# Patient Record
Sex: FEMALE | Race: WHITE | NOT HISPANIC OR LATINO | Employment: FULL TIME | URBAN - METROPOLITAN AREA
[De-identification: names, ages, dates, MRNs, and addresses within clinical notes are randomized per-mention and may not be internally consistent; named-entity substitution may affect disease eponyms.]

---

## 2021-09-30 ENCOUNTER — HISTORICAL (OUTPATIENT)
Dept: ADMINISTRATIVE | Facility: HOSPITAL | Age: 26
End: 2021-09-30

## 2021-10-02 LAB — FINAL CULTURE: NORMAL

## 2022-01-20 ENCOUNTER — HISTORICAL (OUTPATIENT)
Dept: ADMINISTRATIVE | Facility: HOSPITAL | Age: 27
End: 2022-01-20

## 2022-01-20 LAB
ABS NEUT (OLG): 4.41 X10(3)/MCL (ref 2.1–9.2)
ALBUMIN SERPL-MCNC: 3.8 GM/DL (ref 3.5–5)
ALBUMIN/GLOB SERPL: 1.4 RATIO (ref 1.1–2)
ALP SERPL-CCNC: 44 UNIT/L (ref 40–150)
ALT SERPL-CCNC: 14 UNIT/L (ref 0–55)
APPEARANCE, UA: CLEAR
AST SERPL-CCNC: 16 UNIT/L (ref 5–34)
BACTERIA SPEC CULT: ABNORMAL /HPF
BASOPHILS # BLD AUTO: 0.1 X10(3)/MCL (ref 0–0.2)
BASOPHILS NFR BLD AUTO: 1 %
BILIRUB SERPL-MCNC: 0.5 MG/DL
BILIRUB UR QL STRIP: NEGATIVE
BILIRUBIN DIRECT+TOT PNL SERPL-MCNC: 0.2 MG/DL (ref 0–0.5)
BILIRUBIN DIRECT+TOT PNL SERPL-MCNC: 0.3 MG/DL (ref 0–0.8)
BUN SERPL-MCNC: 10.9 MG/DL (ref 7–18.7)
C3 SERPL-MCNC: 88 MG/DL (ref 80–173)
C4 SERPL-MCNC: 14.3 MG/DL (ref 13–46)
CALCIUM SERPL-MCNC: 8.8 MG/DL (ref 8.7–10.5)
CHLORIDE SERPL-SCNC: 106 MMOL/L (ref 98–107)
CO2 SERPL-SCNC: 27 MMOL/L (ref 22–29)
COLOR UR: YELLOW
CREAT SERPL-MCNC: 0.71 MG/DL (ref 0.55–1.02)
CRP SERPL HS-MCNC: 0.19 MG/DL
DEPRECATED CALCIDIOL+CALCIFEROL SERPL-MC: 36.9 NG/ML (ref 30–80)
EOSINOPHIL # BLD AUTO: 0.3 X10(3)/MCL (ref 0–0.9)
EOSINOPHIL NFR BLD AUTO: 4 %
ERYTHROCYTE [DISTWIDTH] IN BLOOD BY AUTOMATED COUNT: 12.8 % (ref 11.5–17)
GLOBULIN SER-MCNC: 2.7 GM/DL (ref 2.4–3.5)
GLUCOSE (UA): NEGATIVE
GLUCOSE SERPL-MCNC: 69 MG/DL (ref 74–100)
HCT VFR BLD AUTO: 36.4 % (ref 37–47)
HGB BLD-MCNC: 11.5 GM/DL (ref 12–16)
HGB UR QL STRIP: NEGATIVE
KETONES UR QL STRIP: NEGATIVE
LEUKOCYTE ESTERASE UR QL STRIP: NEGATIVE
LYMPHOCYTES # BLD AUTO: 0.9 X10(3)/MCL (ref 0.6–4.6)
LYMPHOCYTES NFR BLD AUTO: 13 %
MCH RBC QN AUTO: 29.3 PG (ref 27–31)
MCHC RBC AUTO-ENTMCNC: 31.6 GM/DL (ref 33–36)
MCV RBC AUTO: 92.9 FL (ref 80–94)
MONOCYTES # BLD AUTO: 0.9 X10(3)/MCL (ref 0.1–1.3)
MONOCYTES NFR BLD AUTO: 14 %
NEUTROPHILS # BLD AUTO: 4.41 X10(3)/MCL (ref 2.1–9.2)
NEUTROPHILS NFR BLD AUTO: 68 %
NITRITE UR QL STRIP: NEGATIVE
PH UR STRIP: 7 [PH] (ref 5–9)
PLATELET # BLD AUTO: 215 X10(3)/MCL (ref 130–400)
PMV BLD AUTO: 9.9 FL (ref 9.4–12.4)
POTASSIUM SERPL-SCNC: 3.6 MMOL/L (ref 3.5–5.1)
PROT SERPL-MCNC: 6.5 GM/DL (ref 6.4–8.3)
PROT UR QL STRIP: ABNORMAL
RBC # BLD AUTO: 3.92 X10(6)/MCL (ref 4.2–5.4)
RBC #/AREA URNS HPF: ABNORMAL /[HPF]
SODIUM SERPL-SCNC: 140 MMOL/L (ref 136–145)
SP GR UR STRIP: 1.02 (ref 1–1.03)
SQUAMOUS EPITHELIAL, UA: ABNORMAL /HPF (ref 0–4)
TSH SERPL-ACNC: 2.19 UIU/ML (ref 0.35–4.94)
UROBILINOGEN UR STRIP-ACNC: 0.2
WBC # SPEC AUTO: 6.5 X10(3)/MCL (ref 4.5–11.5)
WBC #/AREA URNS AUTO: ABNORMAL /HPF (ref 0–3)
WBC #/AREA URNS HPF: ABNORMAL /[HPF]

## 2022-04-09 ENCOUNTER — HISTORICAL (OUTPATIENT)
Dept: ADMINISTRATIVE | Facility: HOSPITAL | Age: 27
End: 2022-04-09
Payer: MEDICAID

## 2022-04-21 ENCOUNTER — HISTORICAL (OUTPATIENT)
Dept: ADMINISTRATIVE | Facility: HOSPITAL | Age: 27
End: 2022-04-21
Payer: MEDICAID

## 2022-04-21 LAB
APPEARANCE, UA: CLEAR
BACTERIA SPEC CULT: NORMAL
BILIRUB UR QL STRIP: NEGATIVE
BUDDING YEAST: NORMAL
CASTS, UA: NORMAL
COLOR UR: YELLOW
CREAT UR-MCNC: 29.9 MG/DL (ref 45–106)
CRYSTALS: NORMAL
GLUCOSE (UA): NEGATIVE
HGB UR QL STRIP: NEGATIVE
KETONES UR QL STRIP: NEGATIVE
LEUKOCYTE ESTERASE UR QL STRIP: NEGATIVE
NITRITE UR QL STRIP: NEGATIVE
PH UR STRIP: 8 [PH] (ref 5–9)
PROT UR QL STRIP: NORMAL
PROT UR STRIP-MCNC: 24.1 MG/DL
PROT/CREAT UR-RTO: 806
RBC #/AREA URNS HPF: NORMAL /[HPF] (ref 0–2)
SMALL ROUND CELLS, UA: NORMAL
SP GR UR STRIP: 1.01 (ref 1–1.03)
SPERM URNS QL MICRO: NORMAL
SQUAMOUS EPITHELIAL, UA: NORMAL (ref 0–4)
UA WBC MAN: NORMAL (ref 0–2)
UROBILINOGEN UR STRIP-ACNC: 0.2
WBC #/AREA URNS HPF: NORMAL /[HPF] (ref 0–2)

## 2022-04-25 VITALS
BODY MASS INDEX: 25.42 KG/M2 | OXYGEN SATURATION: 95 % | HEIGHT: 65 IN | DIASTOLIC BLOOD PRESSURE: 70 MMHG | SYSTOLIC BLOOD PRESSURE: 111 MMHG | WEIGHT: 152.56 LBS

## 2022-05-25 RX ORDER — AZATHIOPRINE 50 MG/1
50 TABLET ORAL 2 TIMES DAILY
Qty: 60 TABLET | Refills: 5 | Status: SHIPPED | OUTPATIENT
Start: 2022-05-25 | End: 2022-10-13 | Stop reason: SDUPTHER

## 2022-05-25 RX ORDER — HYDROXYCHLOROQUINE SULFATE 200 MG/1
200 TABLET, FILM COATED ORAL 2 TIMES DAILY
Qty: 60 TABLET | Refills: 5 | Status: SHIPPED | OUTPATIENT
Start: 2022-05-25 | End: 2022-10-13 | Stop reason: SDUPTHER

## 2022-08-01 ENCOUNTER — TELEPHONE (OUTPATIENT)
Dept: RHEUMATOLOGY | Facility: CLINIC | Age: 27
End: 2022-08-01
Payer: MEDICAID

## 2022-08-01 NOTE — TELEPHONE ENCOUNTER
Pt called wanting to know the results to her urine test she had done at the end of April. Results in chart  Please Advise  Thanks

## 2022-10-13 ENCOUNTER — OFFICE VISIT (OUTPATIENT)
Dept: RHEUMATOLOGY | Facility: CLINIC | Age: 27
End: 2022-10-13
Payer: MEDICAID

## 2022-10-13 VITALS
SYSTOLIC BLOOD PRESSURE: 101 MMHG | DIASTOLIC BLOOD PRESSURE: 61 MMHG | WEIGHT: 146.19 LBS | HEART RATE: 60 BPM | HEIGHT: 64 IN | RESPIRATION RATE: 16 BRPM | OXYGEN SATURATION: 99 % | TEMPERATURE: 99 F | BODY MASS INDEX: 24.96 KG/M2

## 2022-10-13 DIAGNOSIS — E55.9 VITAMIN D DEFICIENCY: ICD-10-CM

## 2022-10-13 DIAGNOSIS — M32.19 OTHER SYSTEMIC LUPUS ERYTHEMATOSUS WITH OTHER ORGAN INVOLVEMENT: Primary | ICD-10-CM

## 2022-10-13 DIAGNOSIS — D68.61 ANTIPHOSPHOLIPID SYNDROME: ICD-10-CM

## 2022-10-13 PROBLEM — M32.9 SYSTEMIC LUPUS ERYTHEMATOSUS: Status: ACTIVE | Noted: 2022-10-13

## 2022-10-13 PROCEDURE — 99213 OFFICE O/P EST LOW 20 MIN: CPT | Mod: PBBFAC | Performed by: INTERNAL MEDICINE

## 2022-10-13 PROCEDURE — 1160F PR REVIEW ALL MEDS BY PRESCRIBER/CLIN PHARMACIST DOCUMENTED: ICD-10-PCS | Mod: CPTII,,, | Performed by: INTERNAL MEDICINE

## 2022-10-13 PROCEDURE — 99999 PR PBB SHADOW E&M-EST. PATIENT-LVL III: CPT | Mod: PBBFAC,,, | Performed by: INTERNAL MEDICINE

## 2022-10-13 PROCEDURE — 1159F PR MEDICATION LIST DOCUMENTED IN MEDICAL RECORD: ICD-10-PCS | Mod: CPTII,,, | Performed by: INTERNAL MEDICINE

## 2022-10-13 PROCEDURE — 99213 PR OFFICE/OUTPT VISIT, EST, LEVL III, 20-29 MIN: ICD-10-PCS | Mod: S$PBB,,, | Performed by: INTERNAL MEDICINE

## 2022-10-13 PROCEDURE — 1160F RVW MEDS BY RX/DR IN RCRD: CPT | Mod: CPTII,,, | Performed by: INTERNAL MEDICINE

## 2022-10-13 PROCEDURE — 99999 PR PBB SHADOW E&M-EST. PATIENT-LVL III: ICD-10-PCS | Mod: PBBFAC,,, | Performed by: INTERNAL MEDICINE

## 2022-10-13 PROCEDURE — 3074F SYST BP LT 130 MM HG: CPT | Mod: CPTII,,, | Performed by: INTERNAL MEDICINE

## 2022-10-13 PROCEDURE — 3078F DIAST BP <80 MM HG: CPT | Mod: CPTII,,, | Performed by: INTERNAL MEDICINE

## 2022-10-13 PROCEDURE — 1159F MED LIST DOCD IN RCRD: CPT | Mod: CPTII,,, | Performed by: INTERNAL MEDICINE

## 2022-10-13 PROCEDURE — 3074F PR MOST RECENT SYSTOLIC BLOOD PRESSURE < 130 MM HG: ICD-10-PCS | Mod: CPTII,,, | Performed by: INTERNAL MEDICINE

## 2022-10-13 PROCEDURE — 3078F PR MOST RECENT DIASTOLIC BLOOD PRESSURE < 80 MM HG: ICD-10-PCS | Mod: CPTII,,, | Performed by: INTERNAL MEDICINE

## 2022-10-13 PROCEDURE — 99213 OFFICE O/P EST LOW 20 MIN: CPT | Mod: S$PBB,,, | Performed by: INTERNAL MEDICINE

## 2022-10-13 RX ORDER — ASPIRIN 81 MG/1
81 TABLET ORAL DAILY
COMMUNITY
Start: 2022-10-04 | End: 2022-10-13 | Stop reason: SDUPTHER

## 2022-10-13 RX ORDER — DICLOFENAC SODIUM 50 MG/1
50 TABLET, DELAYED RELEASE ORAL 2 TIMES DAILY PRN
Qty: 60 TABLET | Refills: 5 | Status: SHIPPED | OUTPATIENT
Start: 2022-10-13 | End: 2022-11-17

## 2022-10-13 RX ORDER — LANOLIN ALCOHOL/MO/W.PET/CERES
1 CREAM (GRAM) TOPICAL NIGHTLY
COMMUNITY
Start: 2022-10-04 | End: 2022-10-13 | Stop reason: SDUPTHER

## 2022-10-13 RX ORDER — HYDROXYCHLOROQUINE SULFATE 200 MG/1
200 TABLET, FILM COATED ORAL 2 TIMES DAILY
Qty: 60 TABLET | Refills: 5 | Status: SHIPPED | OUTPATIENT
Start: 2022-10-13 | End: 2023-01-19 | Stop reason: SDUPTHER

## 2022-10-13 RX ORDER — ASPIRIN 81 MG/1
81 TABLET ORAL DAILY
Qty: 30 TABLET | Refills: 5 | Status: SHIPPED | OUTPATIENT
Start: 2022-10-13 | End: 2023-01-19 | Stop reason: SDUPTHER

## 2022-10-13 RX ORDER — FLUOXETINE HYDROCHLORIDE 20 MG/1
20 CAPSULE ORAL DAILY
COMMUNITY
Start: 2022-10-12 | End: 2023-01-19

## 2022-10-13 RX ORDER — ASPIRIN 325 MG
50000 TABLET, DELAYED RELEASE (ENTERIC COATED) ORAL
Qty: 5 CAPSULE | Refills: 5 | Status: SHIPPED | OUTPATIENT
Start: 2022-10-13 | End: 2023-01-19 | Stop reason: SDUPTHER

## 2022-10-13 RX ORDER — AZATHIOPRINE 50 MG/1
50 TABLET ORAL 2 TIMES DAILY
Qty: 60 TABLET | Refills: 5 | Status: SHIPPED | OUTPATIENT
Start: 2022-10-13 | End: 2023-01-19 | Stop reason: SDUPTHER

## 2022-10-13 RX ORDER — ASPIRIN 325 MG
TABLET, DELAYED RELEASE (ENTERIC COATED) ORAL
COMMUNITY
Start: 2022-10-04 | End: 2022-10-13 | Stop reason: SDUPTHER

## 2022-10-13 RX ORDER — OMEPRAZOLE 40 MG/1
40 CAPSULE, DELAYED RELEASE ORAL DAILY
Qty: 30 CAPSULE | Refills: 11 | Status: SHIPPED | OUTPATIENT
Start: 2022-10-13 | End: 2023-05-17

## 2022-10-13 RX ORDER — BUSPIRONE HYDROCHLORIDE 5 MG/1
5 TABLET ORAL 3 TIMES DAILY PRN
COMMUNITY
Start: 2022-07-03 | End: 2023-01-19

## 2022-10-13 RX ORDER — LANOLIN ALCOHOL/MO/W.PET/CERES
1 CREAM (GRAM) TOPICAL NIGHTLY
Qty: 30 TABLET | Refills: 5 | Status: SHIPPED | OUTPATIENT
Start: 2022-10-13 | End: 2023-01-19

## 2022-10-13 NOTE — PROGRESS NOTES
"Subjective:       Patient ID: Marie Gibson is a 26 y.o. female.    Chief Complaint: Follow-up    The patient is complaining of joint pain involving the MCP PIP wrist elbow shoulders hips knees and ankles bilaterally.  The pain is 1/10 in intensity dull in quality and continuous.  That is associated with a morning stiffness lasting for less than 60 minutes.  Is also having difficulty maintaining a good night of sleep.  This has been associated with myalgias.  Muscle aches are 1/10 in intensity dull in quality and continuous.  They are associated with fatigue.  No fever no chills no others.      Review of Systems   Constitutional:  Negative for appetite change, chills and fever.   HENT:  Negative for congestion, ear pain, mouth sores, nosebleeds and trouble swallowing.    Eyes:  Negative for photophobia and discharge.   Respiratory:  Negative for chest tightness and shortness of breath.    Cardiovascular:  Negative for chest pain.   Gastrointestinal:  Negative for abdominal pain and vomiting.   Endocrine: Negative.    Genitourinary:  Negative for hematuria.   Musculoskeletal:         As per HPI   Skin:  Negative for rash.   Neurological:  Negative for weakness.       Objective:   /61 (BP Location: Left arm, Patient Position: Sitting, BP Method: Small (Automatic))   Pulse 60   Temp 98.5 °F (36.9 °C)   Resp 16   Ht 5' 4" (1.626 m)   Wt 66.3 kg (146 lb 3.2 oz)   SpO2 99%   BMI 25.10 kg/m²      Physical Exam   Constitutional: She is oriented to person, place, and time. She appears well-developed and well-nourished. No distress.   HENT:   Head: Normocephalic and atraumatic.   Right Ear: External ear normal.   Left Ear: External ear normal.   Eyes: Pupils are equal, round, and reactive to light.   Cardiovascular: Normal rate, regular rhythm and normal heart sounds.   Pulmonary/Chest: Breath sounds normal.   Abdominal: Soft. There is no abdominal tenderness.   Musculoskeletal:      Right shoulder: " Normal.      Left shoulder: Normal.      Right elbow: Normal.      Left elbow: Normal.      Right wrist: Normal.      Left wrist: Normal.      Cervical back: Neck supple.      Right hip: Normal.      Left hip: Normal.      Right knee: Normal.      Left knee: Normal.   Lymphadenopathy:     She has no cervical adenopathy.   Neurological: She is alert and oriented to person, place, and time. She displays normal reflexes. No cranial nerve deficit or sensory deficit. She exhibits normal muscle tone. Coordination normal.   Skin: No rash noted. No erythema.   Vitals reviewed.      Right Side Rheumatological Exam     Examination finds the shoulder, elbow, wrist, knee, 1st PIP, 1st MCP, 2nd PIP, 2nd MCP, 3rd PIP, 3rd MCP, 4th PIP, 4th MCP, 5th PIP, 5th MCP, temporomandibular, hip, ankle, 1st MTP, 2nd MTP, 3rd MTP, 4th MTP and 5th MTP normal.    Left Side Rheumatological Exam     Examination finds the shoulder, elbow, wrist, knee, 1st PIP, 1st MCP, 2nd PIP, 2nd MCP, 3rd PIP, 3rd MCP, 4th PIP, 4th MCP, 5th PIP, 5th MCP, temporomandibular, hip, ankle, 1st MTP, 2nd MTP, 3rd MTP, 4th MTP and 5th MTP normal.       Completed Fibromyalgia exam 4/18 tender points.  No data to display     Assessment:       1. Other systemic lupus erythematosus with other organ involvement    2. Antiphospholipid syndrome    3. Vitamin D deficiency              Plan:       Problem List Items Addressed This Visit          Immunology/Multi System    Systemic lupus erythematosus - Primary    Relevant Medications    magnesium oxide (MAG-OX) 400 mg (241.3 mg magnesium) tablet    cholecalciferol, vitamin D3, 1,250 mcg (50,000 unit) capsule    azaTHIOprine (IMURAN) 50 mg Tab    aspirin (ECOTRIN) 81 MG EC tablet    hydrOXYchloroQUINE (PLAQUENIL) 200 mg tablet    Other Relevant Orders    DSDNA    C4 Complement    C3 Complement    Urinalysis    Protein/Creatinine Ratio, Urine    Quantiferon Gold TB    CBC Auto Differential    Comprehensive Metabolic Panel     C-Reactive Protein    Antinuclear Ab, HEp-2 Substrate       Hematology    Antiphospholipid syndrome    Relevant Medications    magnesium oxide (MAG-OX) 400 mg (241.3 mg magnesium) tablet    cholecalciferol, vitamin D3, 1,250 mcg (50,000 unit) capsule    azaTHIOprine (IMURAN) 50 mg Tab    aspirin (ECOTRIN) 81 MG EC tablet    hydrOXYchloroQUINE (PLAQUENIL) 200 mg tablet    Other Relevant Orders    DSDNA    C4 Complement    C3 Complement    Urinalysis    Protein/Creatinine Ratio, Urine    Quantiferon Gold TB    CBC Auto Differential    Comprehensive Metabolic Panel    C-Reactive Protein    Antinuclear Ab, HEp-2 Substrate       Endocrine    Vitamin D deficiency    Relevant Medications    magnesium oxide (MAG-OX) 400 mg (241.3 mg magnesium) tablet    cholecalciferol, vitamin D3, 1,250 mcg (50,000 unit) capsule    azaTHIOprine (IMURAN) 50 mg Tab    aspirin (ECOTRIN) 81 MG EC tablet    hydrOXYchloroQUINE (PLAQUENIL) 200 mg tablet    Other Relevant Orders    DSDNA    C4 Complement    C3 Complement    Urinalysis    Protein/Creatinine Ratio, Urine    Quantiferon Gold TB    CBC Auto Differential    Comprehensive Metabolic Panel    C-Reactive Protein    Antinuclear Ab, HEp-2 Substrate

## 2022-11-16 ENCOUNTER — TELEPHONE (OUTPATIENT)
Dept: RHEUMATOLOGY | Facility: CLINIC | Age: 27
End: 2022-11-16
Payer: MEDICAID

## 2022-11-16 DIAGNOSIS — M32.19 OTHER SYSTEMIC LUPUS ERYTHEMATOSUS WITH OTHER ORGAN INVOLVEMENT: Primary | ICD-10-CM

## 2022-11-16 NOTE — TELEPHONE ENCOUNTER
Patient stated that she is having issues with her stomach since starting Diclofenac and Omeprazole last month when you started her on it. She stated that when she does not take it she does not have any issues. Please advice  Thank you MR

## 2022-11-17 ENCOUNTER — TELEPHONE (OUTPATIENT)
Dept: RHEUMATOLOGY | Facility: CLINIC | Age: 27
End: 2022-11-17
Payer: MEDICAID

## 2022-11-17 RX ORDER — TRAMADOL HYDROCHLORIDE 50 MG/1
50 TABLET ORAL 3 TIMES DAILY PRN
Qty: 90 TABLET | Refills: 5 | Status: SHIPPED | OUTPATIENT
Start: 2022-11-17 | End: 2023-05-17

## 2022-11-17 NOTE — TELEPHONE ENCOUNTER
Notified the patient of tramadol being sent to her pharmacy and to stop the Diclofenac but keep taking the Omeprazole. The patient verbalized understanding

## 2022-12-09 ENCOUNTER — OUTSIDE PLACE OF SERVICE (OUTPATIENT)
Dept: URGENT CARE | Facility: CLINIC | Age: 27
End: 2022-12-09
Payer: MEDICAID

## 2022-12-09 DIAGNOSIS — J10.89 INFLUENZA DUE TO OTHER IDENTIFIED INFLUENZA VIRUS WITH OTHER MANIFESTATIONS: Primary | ICD-10-CM

## 2022-12-09 PROCEDURE — 99212 PR OFFICE/OUTPT VISIT, EST, LEVL II, 10-19 MIN: ICD-10-PCS | Mod: 95,,, | Performed by: FAMILY MEDICINE

## 2022-12-09 PROCEDURE — 99212 OFFICE O/P EST SF 10 MIN: CPT | Mod: 95,,, | Performed by: FAMILY MEDICINE

## 2023-01-06 ENCOUNTER — TELEPHONE (OUTPATIENT)
Dept: RHEUMATOLOGY | Facility: CLINIC | Age: 28
End: 2023-01-06
Payer: MEDICAID

## 2023-01-06 NOTE — TELEPHONE ENCOUNTER
Patient called stating that her Gynecologist has prescribe a low dose estrogen birth control pill and she wants to know if this medication will interact with medications prescribed by Dr. Wallace.

## 2023-01-19 ENCOUNTER — OFFICE VISIT (OUTPATIENT)
Dept: RHEUMATOLOGY | Facility: CLINIC | Age: 28
End: 2023-01-19
Payer: MEDICAID

## 2023-01-19 VITALS
WEIGHT: 157.38 LBS | OXYGEN SATURATION: 97 % | HEIGHT: 64 IN | HEART RATE: 73 BPM | BODY MASS INDEX: 26.87 KG/M2 | TEMPERATURE: 99 F | DIASTOLIC BLOOD PRESSURE: 57 MMHG | SYSTOLIC BLOOD PRESSURE: 102 MMHG

## 2023-01-19 DIAGNOSIS — D50.0 IRON DEFICIENCY ANEMIA DUE TO CHRONIC BLOOD LOSS: ICD-10-CM

## 2023-01-19 DIAGNOSIS — E55.9 VITAMIN D DEFICIENCY: ICD-10-CM

## 2023-01-19 DIAGNOSIS — D68.61 ANTIPHOSPHOLIPID SYNDROME: ICD-10-CM

## 2023-01-19 DIAGNOSIS — M32.19 OTHER SYSTEMIC LUPUS ERYTHEMATOSUS WITH OTHER ORGAN INVOLVEMENT: Primary | ICD-10-CM

## 2023-01-19 PROCEDURE — 99213 OFFICE O/P EST LOW 20 MIN: CPT | Mod: PBBFAC | Performed by: INTERNAL MEDICINE

## 2023-01-19 PROCEDURE — 3078F DIAST BP <80 MM HG: CPT | Mod: CPTII,,, | Performed by: INTERNAL MEDICINE

## 2023-01-19 PROCEDURE — 3074F SYST BP LT 130 MM HG: CPT | Mod: CPTII,,, | Performed by: INTERNAL MEDICINE

## 2023-01-19 PROCEDURE — 99214 OFFICE O/P EST MOD 30 MIN: CPT | Mod: S$PBB,,, | Performed by: INTERNAL MEDICINE

## 2023-01-19 PROCEDURE — 99214 PR OFFICE/OUTPT VISIT, EST, LEVL IV, 30-39 MIN: ICD-10-PCS | Mod: S$PBB,,, | Performed by: INTERNAL MEDICINE

## 2023-01-19 PROCEDURE — 3078F PR MOST RECENT DIASTOLIC BLOOD PRESSURE < 80 MM HG: ICD-10-PCS | Mod: CPTII,,, | Performed by: INTERNAL MEDICINE

## 2023-01-19 PROCEDURE — 3008F BODY MASS INDEX DOCD: CPT | Mod: CPTII,,, | Performed by: INTERNAL MEDICINE

## 2023-01-19 PROCEDURE — 99999 PR PBB SHADOW E&M-EST. PATIENT-LVL III: ICD-10-PCS | Mod: PBBFAC,,, | Performed by: INTERNAL MEDICINE

## 2023-01-19 PROCEDURE — 1159F PR MEDICATION LIST DOCUMENTED IN MEDICAL RECORD: ICD-10-PCS | Mod: CPTII,,, | Performed by: INTERNAL MEDICINE

## 2023-01-19 PROCEDURE — 1160F PR REVIEW ALL MEDS BY PRESCRIBER/CLIN PHARMACIST DOCUMENTED: ICD-10-PCS | Mod: CPTII,,, | Performed by: INTERNAL MEDICINE

## 2023-01-19 PROCEDURE — 3008F PR BODY MASS INDEX (BMI) DOCUMENTED: ICD-10-PCS | Mod: CPTII,,, | Performed by: INTERNAL MEDICINE

## 2023-01-19 PROCEDURE — 99999 PR PBB SHADOW E&M-EST. PATIENT-LVL III: CPT | Mod: PBBFAC,,, | Performed by: INTERNAL MEDICINE

## 2023-01-19 PROCEDURE — 1160F RVW MEDS BY RX/DR IN RCRD: CPT | Mod: CPTII,,, | Performed by: INTERNAL MEDICINE

## 2023-01-19 PROCEDURE — 3074F PR MOST RECENT SYSTOLIC BLOOD PRESSURE < 130 MM HG: ICD-10-PCS | Mod: CPTII,,, | Performed by: INTERNAL MEDICINE

## 2023-01-19 PROCEDURE — 1159F MED LIST DOCD IN RCRD: CPT | Mod: CPTII,,, | Performed by: INTERNAL MEDICINE

## 2023-01-19 RX ORDER — ASPIRIN 325 MG
50000 TABLET, DELAYED RELEASE (ENTERIC COATED) ORAL
Qty: 5 CAPSULE | Refills: 5 | Status: SHIPPED | OUTPATIENT
Start: 2023-01-19 | End: 2023-05-17 | Stop reason: SDUPTHER

## 2023-01-19 RX ORDER — CYCLOBENZAPRINE HCL 10 MG
10 TABLET ORAL NIGHTLY
Qty: 30 TABLET | Refills: 5 | Status: SHIPPED | OUTPATIENT
Start: 2023-01-19 | End: 2023-05-17 | Stop reason: SDUPTHER

## 2023-01-19 RX ORDER — BUSPIRONE HYDROCHLORIDE 10 MG/1
10 TABLET ORAL 3 TIMES DAILY
COMMUNITY
Start: 2022-10-19

## 2023-01-19 RX ORDER — NORETHINDRONE ACETATE AND ETHINYL ESTRADIOL AND FERROUS FUMARATE 1MG-20(24)
1 KIT ORAL DAILY
COMMUNITY
Start: 2023-01-06 | End: 2023-05-17

## 2023-01-19 RX ORDER — HYDROXYCHLOROQUINE SULFATE 200 MG/1
200 TABLET, FILM COATED ORAL 2 TIMES DAILY
Qty: 60 TABLET | Refills: 5 | Status: SHIPPED | OUTPATIENT
Start: 2023-01-19 | End: 2023-05-17 | Stop reason: SDUPTHER

## 2023-01-19 RX ORDER — ASPIRIN 81 MG/1
81 TABLET ORAL DAILY
Qty: 30 TABLET | Refills: 5 | Status: SHIPPED | OUTPATIENT
Start: 2023-01-19 | End: 2023-09-22 | Stop reason: SDUPTHER

## 2023-01-19 RX ORDER — AZATHIOPRINE 50 MG/1
50 TABLET ORAL 2 TIMES DAILY
Qty: 60 TABLET | Refills: 5 | Status: SHIPPED | OUTPATIENT
Start: 2023-01-19 | End: 2023-05-17 | Stop reason: SDUPTHER

## 2023-01-19 RX ORDER — FERROUS SULFATE 325(65) MG
325 TABLET, DELAYED RELEASE (ENTERIC COATED) ORAL DAILY
Qty: 30 TABLET | Refills: 5 | Status: SHIPPED | OUTPATIENT
Start: 2023-01-19 | End: 2023-05-17 | Stop reason: SDUPTHER

## 2023-01-19 NOTE — PROGRESS NOTES
"Subjective:       Patient ID: Marie Gibson is a 27 y.o. female.    Chief Complaint: Follow-up (No complaints/)    The patient is complaining of joint pain involving the MCP PIP wrist elbow shoulders hips knees and ankles bilaterally.  The pain is 3/10 in intensity dull in quality and continuous.  That is associated with a morning stiffness lasting for more than 60 minutes.  Is also having difficulty maintaining a good night of sleep.  This has been associated with myalgias.  Muscle aches are 3/10 in intensity dull in quality and continuous.  They are associated with fatigue.  No fever no chills no others.      Review of Systems   Constitutional:  Negative for appetite change, chills and fever.   HENT:  Negative for congestion, ear pain, mouth sores, nosebleeds and trouble swallowing.    Eyes:  Negative for photophobia and discharge.   Respiratory:  Negative for chest tightness and shortness of breath.    Cardiovascular:  Negative for chest pain.   Gastrointestinal:  Negative for abdominal pain and vomiting.   Endocrine: Negative.    Genitourinary:  Negative for hematuria.   Musculoskeletal:         As per HPI   Skin:  Negative for rash.   Neurological:  Negative for weakness.       Objective:   BP (!) 102/57 (BP Location: Left arm, Patient Position: Sitting, BP Method: Medium (Automatic))   Pulse 73   Temp 98.7 °F (37.1 °C) (Oral)   Ht 5' 4" (1.626 m)   Wt 71.4 kg (157 lb 6.4 oz)   LMP  (LMP Unknown)   SpO2 97%   BMI 27.02 kg/m²      Physical Exam   Constitutional: She is oriented to person, place, and time. She appears well-developed and well-nourished. No distress.   HENT:   Head: Normocephalic and atraumatic.   Right Ear: External ear normal.   Left Ear: External ear normal.   Eyes: Pupils are equal, round, and reactive to light.   Cardiovascular: Normal rate, regular rhythm and normal heart sounds.   Pulmonary/Chest: Breath sounds normal.   Abdominal: Soft. There is no abdominal tenderness. "   Musculoskeletal:      Right shoulder: Normal.      Left shoulder: Normal.      Right elbow: Normal.      Left elbow: Normal.      Right wrist: Normal.      Left wrist: Normal.      Cervical back: Neck supple.      Right hip: Normal.      Left hip: Normal.      Right knee: Normal.      Left knee: Normal.   Lymphadenopathy:     She has no cervical adenopathy.   Neurological: She is alert and oriented to person, place, and time. She displays normal reflexes. No cranial nerve deficit or sensory deficit. She exhibits normal muscle tone. Coordination normal.   Skin: No rash noted. No erythema.   Vitals reviewed.      Right Side Rheumatological Exam     Examination finds the shoulder, elbow, wrist, knee, 1st PIP, 1st MCP, 2nd PIP, 2nd MCP, 3rd PIP, 3rd MCP, 4th PIP, 4th MCP, 5th PIP, 5th MCP, temporomandibular, hip, ankle, 1st MTP, 2nd MTP, 3rd MTP, 4th MTP and 5th MTP normal.    Left Side Rheumatological Exam     Examination finds the shoulder, elbow, wrist, knee, 1st PIP, 1st MCP, 2nd PIP, 2nd MCP, 3rd PIP, 3rd MCP, 4th PIP, 4th MCP, 5th PIP, 5th MCP, temporomandibular, hip, ankle, 1st MTP, 2nd MTP, 3rd MTP, 4th MTP and 5th MTP normal.       Completed Fibromyalgia exam 8/18 tender points.  No data to display     Assessment:       1. Other systemic lupus erythematosus with other organ involvement    2. Antiphospholipid syndrome    3. Vitamin D deficiency    4. Iron deficiency anemia due to chronic blood loss            Medication List with Changes/Refills   New Medications    CYCLOBENZAPRINE (FLEXERIL) 10 MG TABLET    Take 1 tablet (10 mg total) by mouth every evening.       Start Date: 1/19/2023 End Date: 7/18/2023    FERROUS SULFATE 325 (65 FE) MG EC TABLET    Take 1 tablet (325 mg total) by mouth once daily.       Start Date: 1/19/2023 End Date: --   Current Medications    BUSPIRONE (BUSPAR) 10 MG TABLET    Take 10 mg by mouth 3 (three) times daily.       Start Date: 10/19/2022End Date: --    JUNEL FE 24 1 MG-20 MCG  (24)/75 MG (4) PER TABLET    Take 1 tablet by mouth once daily.       Start Date: 1/6/2023  End Date: --    OMEPRAZOLE (PRILOSEC) 40 MG CAPSULE    Take 1 capsule (40 mg total) by mouth once daily. Before lunch       Start Date: 10/13/2022End Date: 10/13/2023    TRAMADOL (ULTRAM) 50 MG TABLET    Take 1 tablet (50 mg total) by mouth 3 (three) times daily as needed for Pain.       Start Date: 11/17/2022End Date: --   Changed and/or Refilled Medications    Modified Medication Previous Medication    ASPIRIN (ECOTRIN) 81 MG EC TABLET aspirin (ECOTRIN) 81 MG EC tablet       Take 1 tablet (81 mg total) by mouth once daily.    Take 1 tablet (81 mg total) by mouth once daily.       Start Date: 1/19/2023 End Date: --    Start Date: 10/13/2022End Date: 1/19/2023    AZATHIOPRINE (IMURAN) 50 MG TAB azaTHIOprine (IMURAN) 50 mg Tab       Take 1 tablet (50 mg total) by mouth 2 (two) times daily.    Take 1 tablet (50 mg total) by mouth 2 (two) times daily.       Start Date: 1/19/2023 End Date: 1/19/2024    Start Date: 10/13/2022End Date: 1/19/2023    CHOLECALCIFEROL, VITAMIN D3, 1,250 MCG (50,000 UNIT) CAPSULE cholecalciferol, vitamin D3, 1,250 mcg (50,000 unit) capsule       Take 1 capsule (50,000 Units total) by mouth every 7 days.    Take 1 capsule (50,000 Units total) by mouth every 7 days.       Start Date: 1/19/2023 End Date: --    Start Date: 10/13/2022End Date: 1/19/2023    HYDROXYCHLOROQUINE (PLAQUENIL) 200 MG TABLET hydrOXYchloroQUINE (PLAQUENIL) 200 mg tablet       Take 1 tablet (200 mg total) by mouth 2 (two) times daily.    Take 1 tablet (200 mg total) by mouth 2 (two) times daily.       Start Date: 1/19/2023 End Date: --    Start Date: 10/13/2022End Date: 1/19/2023   Discontinued Medications    BUSPIRONE (BUSPAR) 5 MG TAB    Take 5 mg by mouth 3 (three) times daily as needed.       Start Date: 7/3/2022  End Date: 1/19/2023    FLUOXETINE 20 MG CAPSULE    Take 20 mg by mouth once daily.       Start Date: 10/12/2022End  Date: 1/19/2023    MAGNESIUM OXIDE (MAG-OX) 400 MG (241.3 MG MAGNESIUM) TABLET    Take 1 tablet (400 mg total) by mouth every evening.       Start Date: 10/13/2022End Date: 1/19/2023         Plan:         Problem List Items Addressed This Visit          Immunology/Multi System    Systemic lupus erythematosus - Primary    Relevant Medications    cyclobenzaprine (FLEXERIL) 10 MG tablet    aspirin (ECOTRIN) 81 MG EC tablet    azaTHIOprine (IMURAN) 50 mg Tab    cholecalciferol, vitamin D3, 1,250 mcg (50,000 unit) capsule    hydrOXYchloroQUINE (PLAQUENIL) 200 mg tablet    ferrous sulfate 325 (65 FE) MG EC tablet    Other Relevant Orders    DSDNA    C4 Complement    C3 Complement    Urinalysis    Protein/Creatinine Ratio, Urine    CBC Auto Differential    Comprehensive Metabolic Panel    CRP, High Sensitivity    Vitamin D    Antinuclear Antibody (BRANT), HEp-2, IgG       Hematology    Antiphospholipid syndrome    Relevant Medications    cyclobenzaprine (FLEXERIL) 10 MG tablet    aspirin (ECOTRIN) 81 MG EC tablet    azaTHIOprine (IMURAN) 50 mg Tab    cholecalciferol, vitamin D3, 1,250 mcg (50,000 unit) capsule    hydrOXYchloroQUINE (PLAQUENIL) 200 mg tablet    ferrous sulfate 325 (65 FE) MG EC tablet    Other Relevant Orders    DSDNA    C4 Complement    C3 Complement    Urinalysis    Protein/Creatinine Ratio, Urine    CBC Auto Differential    Comprehensive Metabolic Panel    CRP, High Sensitivity    Vitamin D    Antinuclear Antibody (BRANT), HEp-2, IgG       Endocrine    Vitamin D deficiency    Relevant Medications    cyclobenzaprine (FLEXERIL) 10 MG tablet    aspirin (ECOTRIN) 81 MG EC tablet    azaTHIOprine (IMURAN) 50 mg Tab    cholecalciferol, vitamin D3, 1,250 mcg (50,000 unit) capsule    hydrOXYchloroQUINE (PLAQUENIL) 200 mg tablet    ferrous sulfate 325 (65 FE) MG EC tablet    Other Relevant Orders    DSDNA    C4 Complement    C3 Complement    Urinalysis    Protein/Creatinine Ratio, Urine    CBC Auto Differential     Comprehensive Metabolic Panel    CRP, High Sensitivity    Vitamin D    Antinuclear Antibody (BRANT), HEp-2, IgG     Other Visit Diagnoses       Iron deficiency anemia due to chronic blood loss        Relevant Medications    cyclobenzaprine (FLEXERIL) 10 MG tablet    aspirin (ECOTRIN) 81 MG EC tablet    azaTHIOprine (IMURAN) 50 mg Tab    cholecalciferol, vitamin D3, 1,250 mcg (50,000 unit) capsule    hydrOXYchloroQUINE (PLAQUENIL) 200 mg tablet    ferrous sulfate 325 (65 FE) MG EC tablet    Other Relevant Orders    DSDNA    C4 Complement    C3 Complement    Urinalysis    Protein/Creatinine Ratio, Urine    CBC Auto Differential    Comprehensive Metabolic Panel    CRP, High Sensitivity    Vitamin D    Antinuclear Antibody (BRANT), HEp-2, IgG

## 2023-05-17 ENCOUNTER — OFFICE VISIT (OUTPATIENT)
Dept: RHEUMATOLOGY | Facility: CLINIC | Age: 28
End: 2023-05-17
Payer: MEDICAID

## 2023-05-17 VITALS
OXYGEN SATURATION: 98 % | SYSTOLIC BLOOD PRESSURE: 116 MMHG | DIASTOLIC BLOOD PRESSURE: 77 MMHG | HEIGHT: 64 IN | TEMPERATURE: 99 F | HEART RATE: 83 BPM | BODY MASS INDEX: 28.82 KG/M2 | WEIGHT: 168.81 LBS

## 2023-05-17 DIAGNOSIS — D50.0 IRON DEFICIENCY ANEMIA DUE TO CHRONIC BLOOD LOSS: ICD-10-CM

## 2023-05-17 DIAGNOSIS — D68.61 ANTIPHOSPHOLIPID SYNDROME: ICD-10-CM

## 2023-05-17 DIAGNOSIS — E55.9 VITAMIN D DEFICIENCY: ICD-10-CM

## 2023-05-17 DIAGNOSIS — M32.19 OTHER SYSTEMIC LUPUS ERYTHEMATOSUS WITH OTHER ORGAN INVOLVEMENT: Primary | ICD-10-CM

## 2023-05-17 PROCEDURE — 1159F PR MEDICATION LIST DOCUMENTED IN MEDICAL RECORD: ICD-10-PCS | Mod: CPTII,,, | Performed by: INTERNAL MEDICINE

## 2023-05-17 PROCEDURE — 3078F DIAST BP <80 MM HG: CPT | Mod: CPTII,,, | Performed by: INTERNAL MEDICINE

## 2023-05-17 PROCEDURE — 3008F BODY MASS INDEX DOCD: CPT | Mod: CPTII,,, | Performed by: INTERNAL MEDICINE

## 2023-05-17 PROCEDURE — 99213 OFFICE O/P EST LOW 20 MIN: CPT | Mod: PBBFAC | Performed by: INTERNAL MEDICINE

## 2023-05-17 PROCEDURE — 1159F MED LIST DOCD IN RCRD: CPT | Mod: CPTII,,, | Performed by: INTERNAL MEDICINE

## 2023-05-17 PROCEDURE — 3078F PR MOST RECENT DIASTOLIC BLOOD PRESSURE < 80 MM HG: ICD-10-PCS | Mod: CPTII,,, | Performed by: INTERNAL MEDICINE

## 2023-05-17 PROCEDURE — 99999 PR PBB SHADOW E&M-EST. PATIENT-LVL III: ICD-10-PCS | Mod: PBBFAC,,, | Performed by: INTERNAL MEDICINE

## 2023-05-17 PROCEDURE — 99999 PR PBB SHADOW E&M-EST. PATIENT-LVL III: CPT | Mod: PBBFAC,,, | Performed by: INTERNAL MEDICINE

## 2023-05-17 PROCEDURE — 99214 PR OFFICE/OUTPT VISIT, EST, LEVL IV, 30-39 MIN: ICD-10-PCS | Mod: S$PBB,,, | Performed by: INTERNAL MEDICINE

## 2023-05-17 PROCEDURE — 3008F PR BODY MASS INDEX (BMI) DOCUMENTED: ICD-10-PCS | Mod: CPTII,,, | Performed by: INTERNAL MEDICINE

## 2023-05-17 PROCEDURE — 99214 OFFICE O/P EST MOD 30 MIN: CPT | Mod: S$PBB,,, | Performed by: INTERNAL MEDICINE

## 2023-05-17 PROCEDURE — 3074F PR MOST RECENT SYSTOLIC BLOOD PRESSURE < 130 MM HG: ICD-10-PCS | Mod: CPTII,,, | Performed by: INTERNAL MEDICINE

## 2023-05-17 PROCEDURE — 3074F SYST BP LT 130 MM HG: CPT | Mod: CPTII,,, | Performed by: INTERNAL MEDICINE

## 2023-05-17 RX ORDER — HYDROXYCHLOROQUINE SULFATE 200 MG/1
200 TABLET, FILM COATED ORAL 2 TIMES DAILY
Qty: 60 TABLET | Refills: 5 | Status: SHIPPED | OUTPATIENT
Start: 2023-05-17 | End: 2023-08-21 | Stop reason: SDUPTHER

## 2023-05-17 RX ORDER — CYCLOBENZAPRINE HCL 10 MG
10 TABLET ORAL NIGHTLY
Qty: 30 TABLET | Refills: 5 | Status: SHIPPED | OUTPATIENT
Start: 2023-05-17 | End: 2023-09-22 | Stop reason: SDUPTHER

## 2023-05-17 RX ORDER — FERROUS SULFATE 325(65) MG
325 TABLET, DELAYED RELEASE (ENTERIC COATED) ORAL DAILY
Qty: 30 TABLET | Refills: 5 | Status: SHIPPED | OUTPATIENT
Start: 2023-05-17 | End: 2023-09-22 | Stop reason: SDUPTHER

## 2023-05-17 RX ORDER — AZATHIOPRINE 50 MG/1
50 TABLET ORAL 2 TIMES DAILY
Qty: 60 TABLET | Refills: 5 | Status: SHIPPED | OUTPATIENT
Start: 2023-05-17 | End: 2023-09-22 | Stop reason: SDUPTHER

## 2023-05-17 RX ORDER — ASPIRIN 325 MG
50000 TABLET, DELAYED RELEASE (ENTERIC COATED) ORAL
Qty: 5 CAPSULE | Refills: 5 | Status: SHIPPED | OUTPATIENT
Start: 2023-05-17 | End: 2023-08-21 | Stop reason: SDUPTHER

## 2023-05-17 NOTE — PROGRESS NOTES
"Subjective:       Patient ID: Marie Gibson is a 27 y.o. female.    Chief Complaint: Follow-up (Follow up. Patient states that she gets a sensation in her arms that almost feels like raindrops falling on her arms at times. Also gets a dull pain in her shoulders at times. No pain at this time. )    The patient is complaining of joint pain involving the MCP PIP wrist elbow shoulders hips knees and ankles bilaterally.  The pain is 2/10 in intensity dull in quality and continuous.  That is associated with a morning stiffness lasting for less  than 60 minutes.  Is also having difficulty maintaining a good night of sleep.  This has been associated with myalgias.  Muscle aches are 3/10 in intensity dull in quality and continuous.  They are associated with fatigue.  No fever no chills no others.  Labs checked during this visit       Review of Systems   Constitutional:  Negative for appetite change, chills and fever.   HENT:  Negative for congestion, ear pain, mouth sores, nosebleeds and trouble swallowing.    Eyes:  Negative for photophobia and discharge.   Respiratory:  Negative for chest tightness and shortness of breath.    Cardiovascular:  Negative for chest pain.   Gastrointestinal:  Negative for abdominal pain and vomiting.   Endocrine: Negative.    Genitourinary:  Negative for hematuria.   Musculoskeletal:         As per HPI   Skin:  Negative for rash.   Neurological:  Negative for weakness.       Objective:   /77 (BP Location: Right arm, Patient Position: Sitting, BP Method: Medium (Automatic))   Pulse 83   Temp 98.6 °F (37 °C) (Oral)   Ht 5' 4" (1.626 m)   Wt 76.6 kg (168 lb 12.8 oz)   LMP 04/26/2023   SpO2 98%   BMI 28.97 kg/m²      Physical Exam   Constitutional: She is oriented to person, place, and time. She appears well-developed and well-nourished. No distress.   HENT:   Head: Normocephalic and atraumatic.   Right Ear: External ear normal.   Left Ear: External ear normal.   Eyes: Pupils " are equal, round, and reactive to light.   Cardiovascular: Normal rate, regular rhythm and normal heart sounds.   Pulmonary/Chest: Breath sounds normal.   Abdominal: Soft. There is no abdominal tenderness.   Musculoskeletal:      Right shoulder: Normal.      Left shoulder: Normal.      Right elbow: Normal.      Left elbow: Normal.      Right wrist: Normal.      Left wrist: Normal.      Cervical back: Neck supple.      Right hip: Normal.      Left hip: Normal.      Right knee: Normal.      Left knee: Normal.   Lymphadenopathy:     She has no cervical adenopathy.   Neurological: She is alert and oriented to person, place, and time. She displays normal reflexes. No cranial nerve deficit or sensory deficit. She exhibits normal muscle tone. Coordination normal.   Skin: No rash noted. No erythema.   Vitals reviewed.      Right Side Rheumatological Exam     Examination finds the shoulder, elbow, wrist, knee, 1st PIP, 1st MCP, 2nd PIP, 2nd MCP, 3rd PIP, 3rd MCP, 4th PIP, 4th MCP, 5th PIP, 5th MCP, temporomandibular, hip, ankle, 1st MTP, 2nd MTP, 3rd MTP, 4th MTP and 5th MTP normal.    Left Side Rheumatological Exam     Examination finds the shoulder, elbow, wrist, knee, 1st PIP, 1st MCP, 2nd PIP, 2nd MCP, 3rd PIP, 3rd MCP, 4th PIP, 4th MCP, 5th PIP, 5th MCP, temporomandibular, hip, ankle, 1st MTP, 2nd MTP, 3rd MTP, 4th MTP and 5th MTP normal.       Completed Fibromyalgia exam 11/18 tender points.  No data to display     Assessment:       1. Other systemic lupus erythematosus with other organ involvement    2. Antiphospholipid syndrome    3. Vitamin D deficiency    4. Iron deficiency anemia due to chronic blood loss          Medication List with Changes/Refills   Current Medications    ASPIRIN (ECOTRIN) 81 MG EC TABLET    Take 1 tablet (81 mg total) by mouth once daily.       Start Date: 1/19/2023 End Date: --    BUSPIRONE (BUSPAR) 10 MG TABLET    Take 10 mg by mouth 3 (three) times daily.       Start Date: 10/19/2022End  Date: --   Changed and/or Refilled Medications    Modified Medication Previous Medication    AZATHIOPRINE (IMURAN) 50 MG TAB azaTHIOprine (IMURAN) 50 mg Tab       Take 1 tablet (50 mg total) by mouth 2 (two) times daily.    Take 1 tablet (50 mg total) by mouth 2 (two) times daily.       Start Date: 5/17/2023 End Date: 5/16/2024    Start Date: 1/19/2023 End Date: 5/17/2023    CHOLECALCIFEROL, VITAMIN D3, 1,250 MCG (50,000 UNIT) CAPSULE cholecalciferol, vitamin D3, 1,250 mcg (50,000 unit) capsule       Take 1 capsule (50,000 Units total) by mouth every 7 days.    Take 1 capsule (50,000 Units total) by mouth every 7 days.       Start Date: 5/17/2023 End Date: --    Start Date: 1/19/2023 End Date: 5/17/2023    CYCLOBENZAPRINE (FLEXERIL) 10 MG TABLET cyclobenzaprine (FLEXERIL) 10 MG tablet       Take 1 tablet (10 mg total) by mouth every evening.    Take 1 tablet (10 mg total) by mouth every evening.       Start Date: 5/17/2023 End Date: 11/13/2023    Start Date: 1/19/2023 End Date: 5/17/2023    FERROUS SULFATE 325 (65 FE) MG EC TABLET ferrous sulfate 325 (65 FE) MG EC tablet       Take 1 tablet (325 mg total) by mouth once daily.    Take 1 tablet (325 mg total) by mouth once daily.       Start Date: 5/17/2023 End Date: --    Start Date: 1/19/2023 End Date: 5/17/2023    HYDROXYCHLOROQUINE (PLAQUENIL) 200 MG TABLET hydrOXYchloroQUINE (PLAQUENIL) 200 mg tablet       Take 1 tablet (200 mg total) by mouth 2 (two) times daily.    Take 1 tablet (200 mg total) by mouth 2 (two) times daily.       Start Date: 5/17/2023 End Date: --    Start Date: 1/19/2023 End Date: 5/17/2023   Discontinued Medications    JUNEL FE 24 1 MG-20 MCG (24)/75 MG (4) PER TABLET    Take 1 tablet by mouth once daily.       Start Date: 1/6/2023  End Date: 5/17/2023    OMEPRAZOLE (PRILOSEC) 40 MG CAPSULE    Take 1 capsule (40 mg total) by mouth once daily. Before lunch       Start Date: 10/13/2022End Date: 5/17/2023    TRAMADOL (ULTRAM) 50 MG TABLET    Take  1 tablet (50 mg total) by mouth 3 (three) times daily as needed for Pain.       Start Date: 11/17/2022End Date: 5/17/2023         Plan:         Problem List Items Addressed This Visit          Immunology/Multi System    Systemic lupus erythematosus - Primary    Relevant Medications    azaTHIOprine (IMURAN) 50 mg Tab    cholecalciferol, vitamin D3, 1,250 mcg (50,000 unit) capsule    cyclobenzaprine (FLEXERIL) 10 MG tablet    ferrous sulfate 325 (65 FE) MG EC tablet    hydrOXYchloroQUINE (PLAQUENIL) 200 mg tablet       Hematology    Antiphospholipid syndrome    Relevant Medications    azaTHIOprine (IMURAN) 50 mg Tab    cholecalciferol, vitamin D3, 1,250 mcg (50,000 unit) capsule    cyclobenzaprine (FLEXERIL) 10 MG tablet    ferrous sulfate 325 (65 FE) MG EC tablet    hydrOXYchloroQUINE (PLAQUENIL) 200 mg tablet       Endocrine    Vitamin D deficiency    Relevant Medications    azaTHIOprine (IMURAN) 50 mg Tab    cholecalciferol, vitamin D3, 1,250 mcg (50,000 unit) capsule    cyclobenzaprine (FLEXERIL) 10 MG tablet    ferrous sulfate 325 (65 FE) MG EC tablet    hydrOXYchloroQUINE (PLAQUENIL) 200 mg tablet     Other Visit Diagnoses       Iron deficiency anemia due to chronic blood loss        Relevant Medications    azaTHIOprine (IMURAN) 50 mg Tab    cholecalciferol, vitamin D3, 1,250 mcg (50,000 unit) capsule    cyclobenzaprine (FLEXERIL) 10 MG tablet    ferrous sulfate 325 (65 FE) MG EC tablet    hydrOXYchloroQUINE (PLAQUENIL) 200 mg tablet

## 2023-08-21 DIAGNOSIS — D68.61 ANTIPHOSPHOLIPID SYNDROME: ICD-10-CM

## 2023-08-21 DIAGNOSIS — M32.19 OTHER SYSTEMIC LUPUS ERYTHEMATOSUS WITH OTHER ORGAN INVOLVEMENT: ICD-10-CM

## 2023-08-21 DIAGNOSIS — E55.9 VITAMIN D DEFICIENCY: ICD-10-CM

## 2023-08-21 DIAGNOSIS — D50.0 IRON DEFICIENCY ANEMIA DUE TO CHRONIC BLOOD LOSS: ICD-10-CM

## 2023-08-21 RX ORDER — ASPIRIN 325 MG
50000 TABLET, DELAYED RELEASE (ENTERIC COATED) ORAL
Qty: 5 CAPSULE | Refills: 5 | Status: SHIPPED | OUTPATIENT
Start: 2023-08-21 | End: 2023-08-28 | Stop reason: SDUPTHER

## 2023-08-21 RX ORDER — HYDROXYCHLOROQUINE SULFATE 200 MG/1
200 TABLET, FILM COATED ORAL 2 TIMES DAILY
Qty: 60 TABLET | Refills: 5 | Status: SHIPPED | OUTPATIENT
Start: 2023-08-21 | End: 2023-08-23 | Stop reason: SDUPTHER

## 2023-08-23 DIAGNOSIS — D50.0 IRON DEFICIENCY ANEMIA DUE TO CHRONIC BLOOD LOSS: ICD-10-CM

## 2023-08-23 DIAGNOSIS — E55.9 VITAMIN D DEFICIENCY: ICD-10-CM

## 2023-08-23 DIAGNOSIS — M32.19 OTHER SYSTEMIC LUPUS ERYTHEMATOSUS WITH OTHER ORGAN INVOLVEMENT: ICD-10-CM

## 2023-08-23 DIAGNOSIS — D68.61 ANTIPHOSPHOLIPID SYNDROME: ICD-10-CM

## 2023-08-23 RX ORDER — HYDROXYCHLOROQUINE SULFATE 200 MG/1
200 TABLET, FILM COATED ORAL 2 TIMES DAILY
Qty: 60 TABLET | Refills: 5 | Status: SHIPPED | OUTPATIENT
Start: 2023-08-23 | End: 2023-09-22 | Stop reason: SDUPTHER

## 2023-08-28 DIAGNOSIS — D68.61 ANTIPHOSPHOLIPID SYNDROME: ICD-10-CM

## 2023-08-28 DIAGNOSIS — E55.9 VITAMIN D DEFICIENCY: ICD-10-CM

## 2023-08-28 DIAGNOSIS — M32.19 OTHER SYSTEMIC LUPUS ERYTHEMATOSUS WITH OTHER ORGAN INVOLVEMENT: ICD-10-CM

## 2023-08-28 DIAGNOSIS — D50.0 IRON DEFICIENCY ANEMIA DUE TO CHRONIC BLOOD LOSS: ICD-10-CM

## 2023-08-28 RX ORDER — ASPIRIN 325 MG
50000 TABLET, DELAYED RELEASE (ENTERIC COATED) ORAL
Qty: 5 CAPSULE | Refills: 5 | Status: SHIPPED | OUTPATIENT
Start: 2023-08-28 | End: 2023-09-22 | Stop reason: SDUPTHER

## 2023-09-22 ENCOUNTER — OFFICE VISIT (OUTPATIENT)
Dept: RHEUMATOLOGY | Facility: CLINIC | Age: 28
End: 2023-09-22
Payer: MEDICAID

## 2023-09-22 VITALS
OXYGEN SATURATION: 98 % | HEART RATE: 86 BPM | TEMPERATURE: 99 F | SYSTOLIC BLOOD PRESSURE: 111 MMHG | WEIGHT: 179.63 LBS | BODY MASS INDEX: 30.67 KG/M2 | DIASTOLIC BLOOD PRESSURE: 76 MMHG | HEIGHT: 64 IN

## 2023-09-22 DIAGNOSIS — D68.61 ANTIPHOSPHOLIPID SYNDROME: ICD-10-CM

## 2023-09-22 DIAGNOSIS — E55.9 VITAMIN D DEFICIENCY: ICD-10-CM

## 2023-09-22 DIAGNOSIS — M32.19 OTHER SYSTEMIC LUPUS ERYTHEMATOSUS WITH OTHER ORGAN INVOLVEMENT: Primary | ICD-10-CM

## 2023-09-22 DIAGNOSIS — D50.0 IRON DEFICIENCY ANEMIA DUE TO CHRONIC BLOOD LOSS: ICD-10-CM

## 2023-09-22 PROCEDURE — 3078F DIAST BP <80 MM HG: CPT | Mod: CPTII,,, | Performed by: INTERNAL MEDICINE

## 2023-09-22 PROCEDURE — 99999 PR PBB SHADOW E&M-EST. PATIENT-LVL III: CPT | Mod: PBBFAC,,, | Performed by: INTERNAL MEDICINE

## 2023-09-22 PROCEDURE — 3008F BODY MASS INDEX DOCD: CPT | Mod: CPTII,,, | Performed by: INTERNAL MEDICINE

## 2023-09-22 PROCEDURE — 3078F PR MOST RECENT DIASTOLIC BLOOD PRESSURE < 80 MM HG: ICD-10-PCS | Mod: CPTII,,, | Performed by: INTERNAL MEDICINE

## 2023-09-22 PROCEDURE — 3008F PR BODY MASS INDEX (BMI) DOCUMENTED: ICD-10-PCS | Mod: CPTII,,, | Performed by: INTERNAL MEDICINE

## 2023-09-22 PROCEDURE — 99214 OFFICE O/P EST MOD 30 MIN: CPT | Mod: S$PBB,,, | Performed by: INTERNAL MEDICINE

## 2023-09-22 PROCEDURE — 99214 PR OFFICE/OUTPT VISIT, EST, LEVL IV, 30-39 MIN: ICD-10-PCS | Mod: S$PBB,,, | Performed by: INTERNAL MEDICINE

## 2023-09-22 PROCEDURE — 3074F PR MOST RECENT SYSTOLIC BLOOD PRESSURE < 130 MM HG: ICD-10-PCS | Mod: CPTII,,, | Performed by: INTERNAL MEDICINE

## 2023-09-22 PROCEDURE — 1159F MED LIST DOCD IN RCRD: CPT | Mod: CPTII,,, | Performed by: INTERNAL MEDICINE

## 2023-09-22 PROCEDURE — 99999 PR PBB SHADOW E&M-EST. PATIENT-LVL III: ICD-10-PCS | Mod: PBBFAC,,, | Performed by: INTERNAL MEDICINE

## 2023-09-22 PROCEDURE — 99213 OFFICE O/P EST LOW 20 MIN: CPT | Mod: PBBFAC | Performed by: INTERNAL MEDICINE

## 2023-09-22 PROCEDURE — 1159F PR MEDICATION LIST DOCUMENTED IN MEDICAL RECORD: ICD-10-PCS | Mod: CPTII,,, | Performed by: INTERNAL MEDICINE

## 2023-09-22 PROCEDURE — 3074F SYST BP LT 130 MM HG: CPT | Mod: CPTII,,, | Performed by: INTERNAL MEDICINE

## 2023-09-22 RX ORDER — HYDROXYCHLOROQUINE SULFATE 200 MG/1
200 TABLET, FILM COATED ORAL 2 TIMES DAILY
Qty: 60 TABLET | Refills: 5 | Status: SHIPPED | OUTPATIENT
Start: 2023-09-22

## 2023-09-22 RX ORDER — CYCLOBENZAPRINE HCL 10 MG
10 TABLET ORAL NIGHTLY
Qty: 30 TABLET | Refills: 5 | Status: SHIPPED | OUTPATIENT
Start: 2023-09-22 | End: 2024-03-20

## 2023-09-22 RX ORDER — FERROUS SULFATE 325(65) MG
325 TABLET, DELAYED RELEASE (ENTERIC COATED) ORAL DAILY
Qty: 30 TABLET | Refills: 5 | Status: SHIPPED | OUTPATIENT
Start: 2023-09-22

## 2023-09-22 RX ORDER — ASPIRIN 325 MG
50000 TABLET, DELAYED RELEASE (ENTERIC COATED) ORAL
Qty: 5 CAPSULE | Refills: 5 | Status: SHIPPED | OUTPATIENT
Start: 2023-09-22

## 2023-09-22 RX ORDER — AZATHIOPRINE 50 MG/1
50 TABLET ORAL 2 TIMES DAILY
Qty: 60 TABLET | Refills: 5 | Status: SHIPPED | OUTPATIENT
Start: 2023-09-22 | End: 2024-09-21

## 2023-09-22 RX ORDER — ASPIRIN 81 MG/1
81 TABLET ORAL DAILY
Qty: 30 TABLET | Refills: 5 | Status: SHIPPED | OUTPATIENT
Start: 2023-09-22

## 2023-09-22 NOTE — PROGRESS NOTES
"Subjective:       Patient ID: Marie Gibson is a 27 y.o. female.    Chief Complaint: Follow-up (Follow up. No complaints )    The patient is complaining of joint pain involving the MCP PIP wrist elbow shoulders hips knees and ankles bilaterally.  The pain is 1/10 in intensity dull in quality and continuous.  That is associated with a morning stiffness lasting for more than 60 minutes.  Is also having difficulty maintaining a good night of sleep.  This has been associated with myalgias.  Muscle aches are 1/10 in intensity dull in quality and continuous.  They are associated with fatigue.  No fever no chills no others.  Labs checked during this visit         Review of Systems   Constitutional:  Negative for appetite change, chills and fever.   HENT:  Negative for congestion, ear pain, mouth sores, nosebleeds and trouble swallowing.    Eyes:  Negative for photophobia and discharge.   Respiratory:  Negative for chest tightness and shortness of breath.    Cardiovascular:  Negative for chest pain.   Gastrointestinal:  Negative for abdominal pain and vomiting.   Endocrine: Negative.    Genitourinary:  Negative for hematuria.   Musculoskeletal:         As per HPI   Skin:  Negative for rash.   Neurological:  Negative for weakness.         Objective:   /76 (BP Location: Right arm, Patient Position: Sitting, BP Method: Medium (Automatic))   Pulse 86   Temp 98.7 °F (37.1 °C) (Oral)   Ht 5' 4" (1.626 m)   Wt 81.5 kg (179 lb 9.6 oz)   LMP 09/09/2023   SpO2 98%   BMI 30.83 kg/m²      Physical Exam   Constitutional: She is oriented to person, place, and time. She appears well-developed and well-nourished. No distress.   HENT:   Head: Normocephalic and atraumatic.   Right Ear: External ear normal.   Left Ear: External ear normal.   Eyes: Pupils are equal, round, and reactive to light.   Cardiovascular: Normal rate, regular rhythm and normal heart sounds.   Pulmonary/Chest: Breath sounds normal.   Abdominal: " Soft. There is no abdominal tenderness.   Musculoskeletal:      Right shoulder: Normal.      Left shoulder: Normal.      Right elbow: Normal.      Left elbow: Normal.      Right wrist: Normal.      Left wrist: Normal.      Cervical back: Neck supple.      Right hip: Normal.      Left hip: Normal.      Right knee: Normal.      Left knee: Normal.   Lymphadenopathy:     She has no cervical adenopathy.   Neurological: She is alert and oriented to person, place, and time. She displays normal reflexes. No cranial nerve deficit or sensory deficit. She exhibits normal muscle tone. Coordination normal.   Skin: No rash noted. No erythema.   Vitals reviewed.      Right Side Rheumatological Exam     Examination finds the shoulder, elbow, wrist, knee, 1st PIP, 1st MCP, 2nd PIP, 2nd MCP, 3rd PIP, 3rd MCP, 4th PIP, 4th MCP, 5th PIP, 5th MCP, temporomandibular, hip, ankle, 1st MTP, 2nd MTP, 3rd MTP, 4th MTP and 5th MTP normal.    Left Side Rheumatological Exam     Examination finds the shoulder, elbow, wrist, knee, 1st PIP, 1st MCP, 2nd PIP, 2nd MCP, 3rd PIP, 3rd MCP, 4th PIP, 4th MCP, 5th PIP, 5th MCP, temporomandibular, hip, ankle, 1st MTP, 2nd MTP, 3rd MTP, 4th MTP and 5th MTP normal.         Completed Fibromyalgia exam 4/18 tender points.  No data to display     Assessment:       1. Other systemic lupus erythematosus with other organ involvement    2. Antiphospholipid syndrome    3. Vitamin D deficiency    4. Iron deficiency anemia due to chronic blood loss          Medication List with Changes/Refills   Current Medications    BUSPIRONE (BUSPAR) 10 MG TABLET    Take 10 mg by mouth 3 (three) times daily.       Start Date: 10/19/2022End Date: --   Changed and/or Refilled Medications    Modified Medication Previous Medication    ASPIRIN (ECOTRIN) 81 MG EC TABLET aspirin (ECOTRIN) 81 MG EC tablet       Take 1 tablet (81 mg total) by mouth once daily.    Take 1 tablet (81 mg total) by mouth once daily.       Start Date: 9/22/2023  End Date: --    Start Date: 1/19/2023 End Date: 9/22/2023    AZATHIOPRINE (IMURAN) 50 MG TAB azaTHIOprine (IMURAN) 50 mg Tab       Take 1 tablet (50 mg total) by mouth 2 (two) times daily.    Take 1 tablet (50 mg total) by mouth 2 (two) times daily.       Start Date: 9/22/2023 End Date: 9/21/2024    Start Date: 5/17/2023 End Date: 9/22/2023    CHOLECALCIFEROL, VITAMIN D3, 1,250 MCG (50,000 UNIT) CAPSULE cholecalciferol, vitamin D3, 1,250 mcg (50,000 unit) capsule       Take 1 capsule (50,000 Units total) by mouth every 7 days.    Take 1 capsule (50,000 Units total) by mouth every 7 days.       Start Date: 9/22/2023 End Date: --    Start Date: 8/28/2023 End Date: 9/22/2023    CYCLOBENZAPRINE (FLEXERIL) 10 MG TABLET cyclobenzaprine (FLEXERIL) 10 MG tablet       Take 1 tablet (10 mg total) by mouth every evening.    Take 1 tablet (10 mg total) by mouth every evening.       Start Date: 9/22/2023 End Date: 3/20/2024    Start Date: 5/17/2023 End Date: 9/22/2023    FERROUS SULFATE 325 (65 FE) MG EC TABLET ferrous sulfate 325 (65 FE) MG EC tablet       Take 1 tablet (325 mg total) by mouth once daily.    Take 1 tablet (325 mg total) by mouth once daily.       Start Date: 9/22/2023 End Date: --    Start Date: 5/17/2023 End Date: 9/22/2023    HYDROXYCHLOROQUINE (PLAQUENIL) 200 MG TABLET hydrOXYchloroQUINE (PLAQUENIL) 200 mg tablet       Take 1 tablet (200 mg total) by mouth 2 (two) times daily.    Take 1 tablet (200 mg total) by mouth 2 (two) times daily.       Start Date: 9/22/2023 End Date: --    Start Date: 8/23/2023 End Date: 9/22/2023         Plan:         Problem List Items Addressed This Visit          Immunology/Multi System    Systemic lupus erythematosus - Primary    Relevant Medications    aspirin (ECOTRIN) 81 MG EC tablet    azaTHIOprine (IMURAN) 50 mg Tab    cholecalciferol, vitamin D3, 1,250 mcg (50,000 unit) capsule    cyclobenzaprine (FLEXERIL) 10 MG tablet    ferrous sulfate 325 (65 FE) MG EC tablet     hydroxychloroquine (PLAQUENIL) 200 mg tablet       Hematology    Antiphospholipid syndrome    Relevant Medications    aspirin (ECOTRIN) 81 MG EC tablet    azaTHIOprine (IMURAN) 50 mg Tab    cholecalciferol, vitamin D3, 1,250 mcg (50,000 unit) capsule    cyclobenzaprine (FLEXERIL) 10 MG tablet    ferrous sulfate 325 (65 FE) MG EC tablet    hydroxychloroquine (PLAQUENIL) 200 mg tablet       Endocrine    Vitamin D deficiency    Relevant Medications    aspirin (ECOTRIN) 81 MG EC tablet    azaTHIOprine (IMURAN) 50 mg Tab    cholecalciferol, vitamin D3, 1,250 mcg (50,000 unit) capsule    cyclobenzaprine (FLEXERIL) 10 MG tablet    ferrous sulfate 325 (65 FE) MG EC tablet    hydroxychloroquine (PLAQUENIL) 200 mg tablet     Other Visit Diagnoses       Iron deficiency anemia due to chronic blood loss        Relevant Medications    aspirin (ECOTRIN) 81 MG EC tablet    azaTHIOprine (IMURAN) 50 mg Tab    cholecalciferol, vitamin D3, 1,250 mcg (50,000 unit) capsule    cyclobenzaprine (FLEXERIL) 10 MG tablet    ferrous sulfate 325 (65 FE) MG EC tablet    hydroxychloroquine (PLAQUENIL) 200 mg tablet

## 2024-04-05 ENCOUNTER — DOCUMENTATION ONLY (OUTPATIENT)
Dept: RHEUMATOLOGY | Facility: CLINIC | Age: 29
End: 2024-04-05
Payer: MEDICAID

## 2024-04-05 NOTE — PROGRESS NOTES
Certified letter [id number] [phone]  mailed to patient in regard to Rheumatology provider  from organization/ medication refills. Letter returned due to Marie Gibson incorrect address, etc.

## 2024-10-23 ENCOUNTER — PATIENT MESSAGE (OUTPATIENT)
Dept: GASTROENTEROLOGY | Facility: CLINIC | Age: 29
End: 2024-10-23
Payer: MEDICAID